# Patient Record
Sex: FEMALE | Race: WHITE | NOT HISPANIC OR LATINO | URBAN - METROPOLITAN AREA
[De-identification: names, ages, dates, MRNs, and addresses within clinical notes are randomized per-mention and may not be internally consistent; named-entity substitution may affect disease eponyms.]

---

## 2017-06-01 ENCOUNTER — IMPORTED ENCOUNTER (OUTPATIENT)
Dept: URBAN - METROPOLITAN AREA CLINIC 50 | Facility: CLINIC | Age: 67
End: 2017-06-01

## 2017-08-25 ENCOUNTER — IMPORTED ENCOUNTER (OUTPATIENT)
Dept: URBAN - METROPOLITAN AREA CLINIC 50 | Facility: CLINIC | Age: 67
End: 2017-08-25

## 2018-03-01 ENCOUNTER — IMPORTED ENCOUNTER (OUTPATIENT)
Dept: URBAN - METROPOLITAN AREA CLINIC 50 | Facility: CLINIC | Age: 68
End: 2018-03-01

## 2018-03-15 ENCOUNTER — IMPORTED ENCOUNTER (OUTPATIENT)
Dept: URBAN - METROPOLITAN AREA CLINIC 50 | Facility: CLINIC | Age: 68
End: 2018-03-15

## 2018-08-17 ENCOUNTER — IMPORTED ENCOUNTER (OUTPATIENT)
Dept: URBAN - METROPOLITAN AREA CLINIC 50 | Facility: CLINIC | Age: 68
End: 2018-08-17

## 2018-11-06 ENCOUNTER — IMPORTED ENCOUNTER (OUTPATIENT)
Dept: URBAN - METROPOLITAN AREA CLINIC 50 | Facility: CLINIC | Age: 68
End: 2018-11-06

## 2018-11-13 ENCOUNTER — IMPORTED ENCOUNTER (OUTPATIENT)
Dept: URBAN - METROPOLITAN AREA CLINIC 50 | Facility: CLINIC | Age: 68
End: 2018-11-13

## 2020-08-19 ENCOUNTER — IMPORTED ENCOUNTER (OUTPATIENT)
Dept: URBAN - METROPOLITAN AREA CLINIC 50 | Facility: CLINIC | Age: 70
End: 2020-08-19

## 2020-12-16 ENCOUNTER — IMPORTED ENCOUNTER (OUTPATIENT)
Dept: URBAN - METROPOLITAN AREA CLINIC 50 | Facility: CLINIC | Age: 70
End: 2020-12-16

## 2020-12-16 NOTE — PATIENT DISCUSSION
"""Continue Artificial tears both eyes once a day ."" ""Continue Gel drops both eyes at bedtime ."""

## 2020-12-16 NOTE — PATIENT DISCUSSION
"""Discussed with patient glasses would improve visual acuity.  Will schedule refraction at patients ""

## 2020-12-22 ENCOUNTER — IMPORTED ENCOUNTER (OUTPATIENT)
Dept: URBAN - METROPOLITAN AREA CLINIC 50 | Facility: CLINIC | Age: 70
End: 2020-12-22

## 2020-12-22 NOTE — PATIENT DISCUSSION
"""Educated patient on today's findings.  Final glasses Rx given to patient today for distance only ""

## 2021-02-01 ENCOUNTER — IMPORTED ENCOUNTER (OUTPATIENT)
Dept: URBAN - METROPOLITAN AREA CLINIC 50 | Facility: CLINIC | Age: 71
End: 2021-02-01

## 2021-02-26 ENCOUNTER — IMPORTED ENCOUNTER (OUTPATIENT)
Dept: URBAN - METROPOLITAN AREA CLINIC 50 | Facility: CLINIC | Age: 71
End: 2021-02-26

## 2021-04-18 ASSESSMENT — VISUAL ACUITY
OS_SC: 20/30+
OD_OTHER: 20/40. 20/50.
OD_BAT: 20/30
OD_SC: 20/40+2
OD_SC: 20/30+2
OS_SC: 20/60+1
OS_BAT: 20/30+2
OS_SC: 20/40
OS_BAT: 20/50
OD_CC: J1+-2
OS_CC: J1@ 16 IN
OD_BAT: 20/30+2
OS_BAT: 20/30
OS_SC: 20/25-2
OD_SC: 20/30-1
OD_SC: 20/40
OS_SC: 20/30+1
OD_OTHER: 20/30. 20/30.
OS_PH: 20/50
OD_CC: J1@ 16 IN
OD_BAT: 20/40
OS_OTHER: 20/30. 20/30.
OD_CC: J1+
OS_OTHER: 20/50. 20/60.
OD_SC: 20/30+2
OS_SC: 20/25
OS_SC: 20/30
OS_CC: J1+
OD_SC: 20/30-
OD_PH: 20/30
OS_CC: J1+-2
OD_PH: 20/30-
OD_SC: 20/50

## 2021-04-18 ASSESSMENT — TONOMETRY
OD_IOP_MMHG: 12
OS_IOP_MMHG: 12
OS_IOP_MMHG: 13
OD_IOP_MMHG: 13
OD_IOP_MMHG: 10
OD_IOP_MMHG: 12
OS_IOP_MMHG: 12
OS_IOP_MMHG: 12
OD_IOP_MMHG: 11
OS_IOP_MMHG: 13
OS_IOP_MMHG: 11
OD_IOP_MMHG: 10

## 2021-11-11 ENCOUNTER — PREPPED CHART (OUTPATIENT)
Dept: URBAN - METROPOLITAN AREA CLINIC 52 | Facility: CLINIC | Age: 71
End: 2021-11-11

## 2021-11-12 ENCOUNTER — PROBLEM (OUTPATIENT)
Dept: URBAN - METROPOLITAN AREA CLINIC 52 | Facility: CLINIC | Age: 71
End: 2021-11-12

## 2021-11-12 DIAGNOSIS — H52.4: ICD-10-CM

## 2021-11-12 DIAGNOSIS — H43.393: ICD-10-CM

## 2021-11-12 PROCEDURE — 92014 COMPRE OPH EXAM EST PT 1/>: CPT

## 2021-11-12 PROCEDURE — 92134 CPTRZ OPH DX IMG PST SGM RTA: CPT

## 2021-11-12 PROCEDURE — 92015 DETERMINE REFRACTIVE STATE: CPT

## 2021-11-12 ASSESSMENT — VISUAL ACUITY
OD_GLARE: 20/40
OS_PH: 20/25
OU_CC: J1+@ 14 IN
OS_SC: J1
OD_SC: J3
OD_SC: 20/40
OD_GLARE: 20/30
OU_SC: J1@ 14 IN
OU_SC: 20/25
OD_SC: 20/40-2
OS_GLARE: 20/40
OS_CC: J1+
OD_PH: 20/25
OS_SC: 20/25
OS_GLARE: 20/50
OU_SC: 20/30
OD_CC: J1+
OS_SC: 20/30

## 2021-11-12 ASSESSMENT — TONOMETRY
OS_IOP_MMHG: 10
OD_IOP_MMHG: 10

## 2023-05-19 ENCOUNTER — COMPREHENSIVE EXAM (OUTPATIENT)
Dept: URBAN - METROPOLITAN AREA CLINIC 52 | Facility: CLINIC | Age: 73
End: 2023-05-19

## 2023-05-19 DIAGNOSIS — H43.392: ICD-10-CM

## 2023-05-19 DIAGNOSIS — H52.4: ICD-10-CM

## 2023-05-19 DIAGNOSIS — H43.813: ICD-10-CM

## 2023-05-19 PROCEDURE — 92014 COMPRE OPH EXAM EST PT 1/>: CPT

## 2023-05-19 PROCEDURE — 92015 DETERMINE REFRACTIVE STATE: CPT

## 2023-05-19 ASSESSMENT — VISUAL ACUITY
OD_GLARE: 20/40
OD_SC: 20/50
OS_SC: 20/40-2
OS_GLARE: 20/50
OD_PH: 20/25
OU_SC: J1+
OS_GLARE: 20/70
OS_PH: 20/25
OD_GLARE: 20/60

## 2023-05-19 ASSESSMENT — TONOMETRY
OD_IOP_MMHG: 11
OS_IOP_MMHG: 10

## 2025-03-26 ENCOUNTER — COMPREHENSIVE EXAM (OUTPATIENT)
Age: 75
End: 2025-03-26

## 2025-03-26 DIAGNOSIS — H04.123: ICD-10-CM

## 2025-03-26 DIAGNOSIS — H43.813: ICD-10-CM

## 2025-03-26 DIAGNOSIS — G51.0: ICD-10-CM

## 2025-03-26 DIAGNOSIS — H02.402: ICD-10-CM

## 2025-03-26 DIAGNOSIS — H52.4: ICD-10-CM

## 2025-03-26 PROCEDURE — 99214 OFFICE O/P EST MOD 30 MIN: CPT
